# Patient Record
Sex: FEMALE | Race: BLACK OR AFRICAN AMERICAN | NOT HISPANIC OR LATINO | Employment: FULL TIME | URBAN - METROPOLITAN AREA
[De-identification: names, ages, dates, MRNs, and addresses within clinical notes are randomized per-mention and may not be internally consistent; named-entity substitution may affect disease eponyms.]

---

## 2021-04-29 ENCOUNTER — HOSPITAL ENCOUNTER (EMERGENCY)
Facility: MEDICAL CENTER | Age: 21
End: 2021-04-29
Attending: EMERGENCY MEDICINE
Payer: COMMERCIAL

## 2021-04-29 VITALS
HEART RATE: 73 BPM | HEIGHT: 65 IN | WEIGHT: 155.42 LBS | DIASTOLIC BLOOD PRESSURE: 72 MMHG | TEMPERATURE: 97.8 F | RESPIRATION RATE: 20 BRPM | SYSTOLIC BLOOD PRESSURE: 119 MMHG | BODY MASS INDEX: 25.9 KG/M2 | OXYGEN SATURATION: 99 %

## 2021-04-29 DIAGNOSIS — J02.9 PHARYNGITIS, UNSPECIFIED ETIOLOGY: ICD-10-CM

## 2021-04-29 LAB — S PYO DNA SPEC NAA+PROBE: NOT DETECTED

## 2021-04-29 PROCEDURE — 87651 STREP A DNA AMP PROBE: CPT

## 2021-04-29 PROCEDURE — 700111 HCHG RX REV CODE 636 W/ 250 OVERRIDE (IP): Performed by: EMERGENCY MEDICINE

## 2021-04-29 PROCEDURE — 99284 EMERGENCY DEPT VISIT MOD MDM: CPT | Mod: EDC

## 2021-04-29 RX ORDER — DEXAMETHASONE SODIUM PHOSPHATE 10 MG/ML
16 INJECTION, SOLUTION INTRAMUSCULAR; INTRAVENOUS ONCE
Status: COMPLETED | OUTPATIENT
Start: 2021-04-29 | End: 2021-04-29

## 2021-04-29 RX ADMIN — DEXAMETHASONE SODIUM PHOSPHATE 16 MG: 10 INJECTION INTRAMUSCULAR; INTRAVENOUS at 12:32

## 2021-04-29 NOTE — ED PROVIDER NOTES
ED Provider Note    CHIEF COMPLAINT  Chief Complaint   Patient presents with   • Sore Throat     started 3 days ago. History of tonsilitis. Throat is very red and swollen on visual exam.       HPI  Kamille Duenas is a 20 y.o. female who presents complaint of sore throat.  She is had this for about 3 days.  Her throat is very painful to swallow although she is eating and drinking.  She gets this 3 or 4 times per year.  She is been told in the past she needs her tonsils taken out but there is some kind of problem with insurance.  Typically she gets a dose of steroids and she is feeling better.  She denies any chest pain or shortness of breath but there is little bit of phlegm but no significant respite congestion.  Denies any belly pain.  No change in bowel or bladder.  No rashes.  She is here with her significant other, he has not been sick.  She denies any sick contacts or exposures, specifically COVID-19.  There is no other complaint.    PAST MEDICAL HISTORY  Past Medical History:   Diagnosis Date   • Tonsillitis        FAMILY HISTORY  History reviewed. No pertinent family history.    SOCIAL HISTORY  Social History     Tobacco Use   • Smoking status: Current Some Day Smoker   • Smokeless tobacco: Never Used   Substance Use Topics   • Alcohol use: Never   • Drug use: Never         SURGICAL HISTORY  History reviewed. No pertinent surgical history.    CURRENT MEDICATIONS  Home Medications     Reviewed by Siddharth Emmanuel R.N. (Registered Nurse) on 04/29/21 at 1107  Med List Status: Complete   Medication Last Dose Status        Patient Casimiro Taking any Medications                       I have reviewed the nurses notes and/or the list brought with the patient.    ALLERGIES  Allergies   Allergen Reactions   • Pcn [Penicillins]        REVIEW OF SYSTEMS  See HPI for further details. Review of systems as above, otherwise all other systems are negative.     PHYSICAL EXAM  VITAL SIGNS: /66   Pulse 75   Temp  "37.1 °C (98.7 °F) (Temporal)   Resp 14   Ht 1.651 m (5' 5\")   Wt 70.5 kg (155 lb 6.8 oz)   LMP 03/09/2021   SpO2 99%   Breastfeeding No   BMI 25.86 kg/m²     Constitutional: Well appearing patient in no acute distress.  Not toxic, nor ill in appearance.  HENT: Mucus membranes moist.  Oropharynx is notable for edema over the tonsillar pillars, and associate erythema.  There is no leukoplakia.  There is no asymmetry.  The floor the mouth is normal.  Eyes: Pupils equally round.  No scleral icterus.   Neck: Full nontender range of motion.  Lymphatic: Shotty cervical lymphadenopathy noted.   Cardiovascular: Regular heart rate and rhythm.  No murmurs, rubs, nor gallop appreciated.   Thorax & Lungs: Chest is nontender.  Lungs are clear to auscultation with good air movement bilaterally.  No wheeze, rhonchi, nor rales.   Abdomen: Soft, with no tenderness, rebound nor guarding.  No mass, pulsatile mass, nor hepatosplenomegaly appreciated.  Skin: No purpura nor petechia noted.  Extremities/Musculoskeletal: No sign of trauma.  Calves are nontender with no cords nor edema.  No Mary's sign.  Pulses are intact all around.   Neurologic: Alert & oriented.  Strength and sensation is intact all around.  Gait is normal.  Psychiatric: Normal affect appropriate for the clinical situation.    LABS  Labs Reviewed   GROUP A STREP BY PCR         MEDICAL RECORD  I have reviewed patient's medical record and pertinent results are listed above.    COURSE & MEDICAL DECISION MAKING  I have reviewed any medical record information, laboratory studies and radiographic results as noted above.  This patient presents with pharyngitis.  There is no evidence of group A strep, do not think antibiotics are indicated.  We discussed Covid testing, however she is declined that.  I did give her a dose of steroids, she is feeling better.  I talked over the case with Dr. Fonseca from ENT, who be happy to see her in follow-up.  This was relayed to the " patient.  Instructions on pharyngitis.        FINAL IMPRESSION  1. Pharyngitis, unspecified etiology           This dictation was created using voice recognition software.    Electronically signed by: Wilder Knowles M.D., 4/29/2021 12:38 PM

## 2021-04-29 NOTE — ED NOTES
"Pt ambulatory to Peds 50. Agree with triage RN note. Instructed to change into gown. Pt alert, pink, interactive and in NAD. Reports sore throat x 3 days. Denies fevers. Reports mild cough d/t nasal drainage. Pt states she frequently has swollen tonsils, but has been unable to FLUP with ENT \"because it got denied\". Displays age appropriate interaction with family and staff. Family at bedside. Call light within reach. Denies additional needs. Up for ERP eval.    "

## 2021-04-29 NOTE — ED TRIAGE NOTES
Chief Complaint   Patient presents with   • Sore Throat     started 3 days ago. History of tonsilitis. Throat is very red and swollen on visual exam.     Pt ambulatory to triage for above complaint, no airway compromise.  Pt is AO x 4, follows commands, and responds appropriately to questions. Patient's breathing is nonlabored and pain is currently 7/10 on the 0-10 pain scale.  Pt placed in lobby. Patient educated on triage process and encouraged to alert staff for any changes.

## 2021-04-29 NOTE — ED NOTES
Kamille RENEE/El.  Discharge instructions including s/s to return to ED, follow up appointments, hydration importance and medication administration provided to pt.    Pt verbalized understanding with no further questions and concerns.    Copy of discharge provided to pt.  Signed copy in chart.    Pt walked out of department with friend/family; pt in NAD, awake, alert, interactive and age appropriate.

## 2021-04-29 NOTE — ED NOTES
Pt medicated per MAR. Strep swab obtained and sent to lab. Pt and S/O updated on POC and potential lab wait times.

## 2021-06-03 ENCOUNTER — HOSPITAL ENCOUNTER (EMERGENCY)
Facility: MEDICAL CENTER | Age: 21
End: 2021-06-03
Attending: EMERGENCY MEDICINE

## 2021-06-03 VITALS
WEIGHT: 160.94 LBS | TEMPERATURE: 98 F | RESPIRATION RATE: 16 BRPM | SYSTOLIC BLOOD PRESSURE: 117 MMHG | BODY MASS INDEX: 26.81 KG/M2 | DIASTOLIC BLOOD PRESSURE: 74 MMHG | OXYGEN SATURATION: 97 % | HEIGHT: 65 IN | HEART RATE: 71 BPM

## 2021-06-03 DIAGNOSIS — R09.82 POST-NASAL DRIP: ICD-10-CM

## 2021-06-03 DIAGNOSIS — R09.89 CHOKING SENSATION: ICD-10-CM

## 2021-06-03 LAB
ANION GAP SERPL CALC-SCNC: 8 MMOL/L (ref 7–16)
BASOPHILS # BLD AUTO: 0.5 % (ref 0–1.8)
BASOPHILS # BLD: 0.04 K/UL (ref 0–0.12)
BUN SERPL-MCNC: 6 MG/DL (ref 8–22)
CALCIUM SERPL-MCNC: 9.4 MG/DL (ref 8.5–10.5)
CHLORIDE SERPL-SCNC: 106 MMOL/L (ref 96–112)
CO2 SERPL-SCNC: 23 MMOL/L (ref 20–33)
CREAT SERPL-MCNC: 1.03 MG/DL (ref 0.5–1.4)
EOSINOPHIL # BLD AUTO: 0.12 K/UL (ref 0–0.51)
EOSINOPHIL NFR BLD: 1.4 % (ref 0–6.9)
ERYTHROCYTE [DISTWIDTH] IN BLOOD BY AUTOMATED COUNT: 45.5 FL (ref 35.9–50)
GLUCOSE SERPL-MCNC: 89 MG/DL (ref 65–99)
HCG SERPL QL: NEGATIVE
HCT VFR BLD AUTO: 37.4 % (ref 37–47)
HGB BLD-MCNC: 12.4 G/DL (ref 12–16)
IMM GRANULOCYTES # BLD AUTO: 0.03 K/UL (ref 0–0.11)
IMM GRANULOCYTES NFR BLD AUTO: 0.3 % (ref 0–0.9)
LYMPHOCYTES # BLD AUTO: 3.47 K/UL (ref 1–4.8)
LYMPHOCYTES NFR BLD: 39.5 % (ref 22–41)
MCH RBC QN AUTO: 26.8 PG (ref 27–33)
MCHC RBC AUTO-ENTMCNC: 33.2 G/DL (ref 33.6–35)
MCV RBC AUTO: 81 FL (ref 81.4–97.8)
MONOCYTES # BLD AUTO: 0.85 K/UL (ref 0–0.85)
MONOCYTES NFR BLD AUTO: 9.7 % (ref 0–13.4)
NEUTROPHILS # BLD AUTO: 4.28 K/UL (ref 2–7.15)
NEUTROPHILS NFR BLD: 48.6 % (ref 44–72)
NRBC # BLD AUTO: 0 K/UL
NRBC BLD-RTO: 0 /100 WBC
PLATELET # BLD AUTO: 302 K/UL (ref 164–446)
PMV BLD AUTO: 10.3 FL (ref 9–12.9)
POTASSIUM SERPL-SCNC: 4 MMOL/L (ref 3.6–5.5)
RBC # BLD AUTO: 4.62 M/UL (ref 4.2–5.4)
SODIUM SERPL-SCNC: 137 MMOL/L (ref 135–145)
TSH SERPL DL<=0.005 MIU/L-ACNC: 2.39 UIU/ML (ref 0.38–5.33)
WBC # BLD AUTO: 8.8 K/UL (ref 4.8–10.8)

## 2021-06-03 PROCEDURE — 84703 CHORIONIC GONADOTROPIN ASSAY: CPT

## 2021-06-03 PROCEDURE — 85025 COMPLETE CBC W/AUTO DIFF WBC: CPT

## 2021-06-03 PROCEDURE — 84443 ASSAY THYROID STIM HORMONE: CPT

## 2021-06-03 PROCEDURE — 80048 BASIC METABOLIC PNL TOTAL CA: CPT

## 2021-06-03 PROCEDURE — 99284 EMERGENCY DEPT VISIT MOD MDM: CPT

## 2021-06-03 RX ORDER — FLUTICASONE PROPIONATE 50 MCG
1 SPRAY, SUSPENSION (ML) NASAL DAILY
Qty: 9.9 ML | Refills: 0 | Status: SHIPPED | OUTPATIENT
Start: 2021-06-03

## 2021-06-04 NOTE — ED NOTES
Pt discharged. Pt provided information about post nasal drip. Pt educated to follow-up w/ PCP and to return to the ER w/ any worsening symptoms. Pt had prescription e-scribed to pharmacy. Pt walked to the lobby w/ SO.

## 2021-06-04 NOTE — ED TRIAGE NOTES
"Kamille Duenas  20 y.o. F  Chief Complaint   Patient presents with   • Oral Swelling     Patient reporting swollen tonsils but denies soreness. Patient states this happens \"often.\" Patient scheduled an appointment with an ENT but her insurance did not cover it.    • Vaginal Bleeding     Patient reports passing large clots during her cycle but they are now tapering off      Vitals:    06/03/21 1924   BP: 113/69   Pulse: (Abnormal) 101   Resp: 14   Temp: 36.8 °C (98.2 °F)   SpO2: 97%     Triage process explained to patient, apologized for wait time, and returned to lobby.  Pt informed to notify staff of any change in condition.     "

## 2021-06-04 NOTE — ED PROVIDER NOTES
"ED Provider Note    Scribed for Juan Everett M.D. by Haim Teresa. 6/3/2021, 9:43 PM.    Primary care provider: Pcp Pt States None  Means of arrival: Walk-in  History obtained from: Patient  History limited by: None    CHIEF COMPLAINT  Chief Complaint   Patient presents with    Oral Swelling     Patient reporting swollen tonsils but denies soreness. Patient states this happens \"often.\" Patient scheduled an appointment with an ENT but her insurance did not cover it.     Vaginal Bleeding     Patient reports passing large clots during her cycle but they are now tapering off        HPI  Kamille Duenas is a 20 y.o. female who presents to the Emergency Department complaining of acute on chronic throat swelling onset earlier today. The patient has had several episodes of throat swelling in the past and states that her swelling today is not as severe as some previous episodes. The sensation of swelling is exacerbated when the patient lies flat on her back. She denies any sore throat. The patient also states she feels a sensation of mucus trickling down her throat. The patient presented to the ED today since she has not yet been able to get an appointment with her ENT. Patient denies a history of thyroid issues. Patient notes that she has recently been feeling severely hot but does not have a measured fever. She denies any dry skin or hair loss.  The patient also notes vaginal bleeding, stating she passed several larger than normal clots during her cycle. She denies a chance of pregnancy. Patient did not have any abdominal cramping during this menstrual period. Patient denies any cough or sore throat. She has not been vaccinated against Covid-19.    REVIEW OF SYSTEMS  Pertinent positives include oral swelling, vaginal bleeding, hot flashes, and post nasal drip. Pertinent negatives include no sore throat, cough, fever, dry skin, hair loss or abdominal cramping. All other systems negative.    PAST MEDICAL " "HISTORY   has a past medical history of Tonsillitis.    SURGICAL HISTORY  patient denies any surgical history    SOCIAL HISTORY  Social History     Tobacco Use    Smoking status: Former Smoker    Smokeless tobacco: Never Used   Vaping Use    Vaping Use: Never used   Substance Use Topics    Alcohol use: Yes     Comment: rarely     Drug use: Yes     Types: Inhaled     Comment: maile      Social History     Substance and Sexual Activity   Drug Use Yes    Types: Inhaled    Comment: [email or web address]       FAMILY HISTORY  History reviewed. No pertinent family history.    CURRENT MEDICATIONS  Home Medications       Reviewed by Rosa Ye R.N. (Registered Nurse) on 06/03/21 at 1936  Med List Status: Not Addressed     Medication Last Dose Status        Patient Casimiro Taking any Medications                           ALLERGIES  Allergies   Allergen Reactions    Pcn [Penicillins]        PHYSICAL EXAM  VITAL SIGNS: /64   Pulse 72   Temp 36.8 °C (98.2 °F) (Temporal)   Resp 16   Ht 1.651 m (5' 5\")   Wt 73 kg (160 lb 15 oz)   LMP 06/03/2021   SpO2 98%   BMI 26.78 kg/m²     Constitutional: Well developed, Well nourished, Mild distress.   HENT: Normocephalic, Atraumatic. Tonsils are mildly enlarged.  Mild postnasal drip, oropharynx moist, no erythema, oral exudates, or uvular deviation.  Eyes: Conjunctiva normal, No discharge.   Neck: Supple, No stridor. No lymphadenopathy. No thyromegaly.  Cardiovascular: Normal heart rate, Normal rhythm, No murmurs, equal pulses.   Pulmonary: Normal breath sounds, No respiratory distress, No wheezing, No rales, No rhonchi.  Abdomen:Soft, No tenderness, No masses, no rebound, no guarding.   Back: No CVA tenderness.   Musculoskeletal: No major deformities noted, No tenderness.   Skin: Warm, Dry, No erythema, No rash.   Neurologic: Alert & oriented x 3, Normal motor function,  No focal deficits noted.   Psychiatric: Affect normal, Judgment normal, Mood normal.     LABS  Results for " orders placed or performed during the hospital encounter of 06/03/21   HCG QUAL SERUM   Result Value Ref Range    Beta-Hcg Qualitative Serum Negative Negative   CBC WITH DIFFERENTIAL   Result Value Ref Range    WBC 8.8 4.8 - 10.8 K/uL    RBC 4.62 4.20 - 5.40 M/uL    Hemoglobin 12.4 12.0 - 16.0 g/dL    Hematocrit 37.4 37.0 - 47.0 %    MCV 81.0 (L) 81.4 - 97.8 fL    MCH 26.8 (L) 27.0 - 33.0 pg    MCHC 33.2 (L) 33.6 - 35.0 g/dL    RDW 45.5 35.9 - 50.0 fL    Platelet Count 302 164 - 446 K/uL    MPV 10.3 9.0 - 12.9 fL    Neutrophils-Polys 48.60 44.00 - 72.00 %    Lymphocytes 39.50 22.00 - 41.00 %    Monocytes 9.70 0.00 - 13.40 %    Eosinophils 1.40 0.00 - 6.90 %    Basophils 0.50 0.00 - 1.80 %    Immature Granulocytes 0.30 0.00 - 0.90 %    Nucleated RBC 0.00 /100 WBC    Neutrophils (Absolute) 4.28 2.00 - 7.15 K/uL    Lymphs (Absolute) 3.47 1.00 - 4.80 K/uL    Monos (Absolute) 0.85 0.00 - 0.85 K/uL    Eos (Absolute) 0.12 0.00 - 0.51 K/uL    Baso (Absolute) 0.04 0.00 - 0.12 K/uL    Immature Granulocytes (abs) 0.03 0.00 - 0.11 K/uL    NRBC (Absolute) 0.00 K/uL   BASIC METABOLIC PANEL   Result Value Ref Range    Sodium 137 135 - 145 mmol/L    Potassium 4.0 3.6 - 5.5 mmol/L    Chloride 106 96 - 112 mmol/L    Co2 23 20 - 33 mmol/L    Glucose 89 65 - 99 mg/dL    Bun 6 (L) 8 - 22 mg/dL    Creatinine 1.03 0.50 - 1.40 mg/dL    Calcium 9.4 8.5 - 10.5 mg/dL    Anion Gap 8.0 7.0 - 16.0   TSH WITH REFLEX TO FT4   Result Value Ref Range    TSH 2.390 0.380 - 5.330 uIU/mL   ESTIMATED GFR   Result Value Ref Range    GFR If African American >60 >60 mL/min/1.73 m 2    GFR If Non African American >60 >60 mL/min/1.73 m 2      All labs reviewed by me.    COURSE & MEDICAL DECISION MAKING  Pertinent Labs & Imaging studies reviewed. (See chart for details)    9:43 PM - Patient seen and examined at bedside. I discussed ordering labs to evaluate the patient's symptoms. I encouraged the patient to get the Covid-19 vaccine and assuaged her fears  about the vaccine being unsafe. Ordered CBC w/ diff, BMP, and HCG qualitative serum to evaluate her symptoms.    Did not feel any obvious thyromegaly.  Patient states that this feels like someone placing a hand on her throat when she lays back.  Patient's TSH is normal.    Patient had stated that she had had been passing large clots in her cycle but this happened last month and is not happening now I did not feel that a pelvic exam was warranted.    Medical Decision Making: At this point time I do not have exact etiology for this sensation.  I think it may be related to postnasal drip.  Patient's thyroid hormones are normal I do not palpate any thyroid megaly.  Patient's tonsils are slightly enlarged but not to the point where they are causing uvular shift and do not appear to be infected at this point in time.  At this point time I think she can be discharged home I will start her on some Flonase for her nasal congestion to help with the postnasal drip.     The patient will return for new or worsening symptoms and is stable at the time of discharge.    Patient has had high blood pressure while in the emergency department, felt likely secondary to medical condition. Counseled patient to monitor blood pressure at home and follow up with primary care physician.     DISPOSITION:  Patient will be discharged home in stable condition.    FOLLOW UP:  Your doctor    Schedule an appointment as soon as possible for a visit in 1 week      [address] [address] [phone]    If you need a doctor    [address] [address] [phone]    If you need a doctor      OUTPATIENT MEDICATIONS:  Discharge Medication List as of 6/3/2021 11:40 PM        START taking these medications    Details   fluticasone (FLONASE) 50 MCG/ACT nasal spray Administer 1 Spray into affected nostril(S) every day., Disp-9.9 mL, R-0, Normal               FINAL  IMPRESSION  1. Post-nasal drip    2. Choking sensation          Haim GARCIA (Scribe), am scribing for, and in the presence of, Juan Everett M.D.    Electronically signed by: Haim Teresa (Scribe), 6/3/2021    Juan GARCIA M.D. personally performed the services described in this documentation, as scribed by Haim Teresa in my presence, and it is both accurate and complete.    C.    The note accurately reflects work and decisions made by me.  Juan Everett M.D.  6/4/2021  3:18 AM

## 2021-08-21 PROCEDURE — 99283 EMERGENCY DEPT VISIT LOW MDM: CPT

## 2021-08-21 PROCEDURE — 93005 ELECTROCARDIOGRAM TRACING: CPT

## 2021-08-21 PROCEDURE — 93005 ELECTROCARDIOGRAM TRACING: CPT | Performed by: EMERGENCY MEDICINE

## 2021-08-22 ENCOUNTER — APPOINTMENT (OUTPATIENT)
Dept: RADIOLOGY | Facility: MEDICAL CENTER | Age: 21
End: 2021-08-22
Attending: EMERGENCY MEDICINE
Payer: MEDICAID

## 2021-08-22 ENCOUNTER — HOSPITAL ENCOUNTER (EMERGENCY)
Facility: MEDICAL CENTER | Age: 21
End: 2021-08-22
Attending: EMERGENCY MEDICINE
Payer: MEDICAID

## 2021-08-22 VITALS
SYSTOLIC BLOOD PRESSURE: 118 MMHG | HEART RATE: 70 BPM | WEIGHT: 176.15 LBS | BODY MASS INDEX: 29.35 KG/M2 | HEIGHT: 65 IN | RESPIRATION RATE: 16 BRPM | DIASTOLIC BLOOD PRESSURE: 66 MMHG | TEMPERATURE: 97.2 F | OXYGEN SATURATION: 96 %

## 2021-08-22 DIAGNOSIS — R06.02 SHORTNESS OF BREATH: ICD-10-CM

## 2021-08-22 DIAGNOSIS — Z20.822 SUSPECTED COVID-19 VIRUS INFECTION: ICD-10-CM

## 2021-08-22 LAB
EKG IMPRESSION: NORMAL
SARS-COV-2 RNA RESP QL NAA+PROBE: NOTDETECTED
SPECIMEN SOURCE: NORMAL

## 2021-08-22 PROCEDURE — 71045 X-RAY EXAM CHEST 1 VIEW: CPT

## 2021-08-22 PROCEDURE — U0005 INFEC AGEN DETEC AMPLI PROBE: HCPCS

## 2021-08-22 PROCEDURE — U0003 INFECTIOUS AGENT DETECTION BY NUCLEIC ACID (DNA OR RNA); SEVERE ACUTE RESPIRATORY SYNDROME CORONAVIRUS 2 (SARS-COV-2) (CORONAVIRUS DISEASE [COVID-19]), AMPLIFIED PROBE TECHNIQUE, MAKING USE OF HIGH THROUGHPUT TECHNOLOGIES AS DESCRIBED BY CMS-2020-01-R: HCPCS

## 2021-08-22 RX ORDER — ALBUTEROL SULFATE 90 UG/1
2 AEROSOL, METERED RESPIRATORY (INHALATION) EVERY 6 HOURS PRN
Qty: 8.5 G | Refills: 0 | Status: SHIPPED | OUTPATIENT
Start: 2021-08-22

## 2021-08-22 NOTE — ED NOTES
Pt Dced at this time. AAOx4, VSS, ambulating with steady gait. Education provided on new rx, f/u care, and all questions addressed. Pt verbalized understanding and ambulated off unit, NADN.

## 2021-08-22 NOTE — ED TRIAGE NOTES
"Chief Complaint   Patient presents with   • Shortness of Breath     Pt states \"my chest is really tight and I'm having trouble breathing.\" Pt also states she has been feeling sick lately, has a cough, and had recent covid exposures at a \"cooking class.\"        Ambulatory to triage for above complaint. Pt states she is unvaccinated and \"doesn't want the shot unless she has to have it.\"  Educated on triage process, encourage to inform staff of any changes.     /71   Pulse 79   Temp 36.2 °C (97.2 °F) (Oral)   Resp 16   Ht 1.638 m (5' 4.5\")   Wt 79.9 kg (176 lb 2.4 oz)   SpO2 99%   BMI 29.77 kg/m²     "

## 2021-08-22 NOTE — ED PROVIDER NOTES
"ED Provider Note    CHIEF COMPLAINT  Chief Complaint   Patient presents with   • Shortness of Breath     Pt states \"my chest is really tight and I'm having trouble breathing.\" Pt also states she has been feeling sick lately, has a cough, and had recent covid exposures at a \"cooking class.\"        HPI  20 y.o. presents to the emergency department with a chief complaint of concerns regarding difficulty breathing and a tightness that she has had with deep inspiration.  She says that she has had a cough, she feels like she has had body aches and chills and had recent Covid exposures after going to a cooking class.  She denies loss of taste and smell, she feels like she has had 3 days of the symptoms with escalations and is concerned with the possibility of developing pneumonia, having Covid.  She is currently unvaccinated.    She has no prior history of asthma, feels like she may have had some wheezes that are currently subsided.    Potential COVID-19 exposure history: has had immediate contact (within 6 feet of continued exposure) with a sick individual    REVIEW OF SYSTEMS  Constitutional: subjective fever, No unintentional recent weight loss  Skin: No rashes or diaphoresis.  Eyes: No change in vision, no discharge.  ENT: No hearing change. No rhinorrhea or nasal congestion, no sore throat or difficulty swallowing.  Respiratory: as above, + coughing without hemoptysis. + Wheezing.  Cardiac: No  palpitations, edema.  GI: No Abdominal Pain; N/V; diarrhea, constipation. No blood in stool.  : No dysuria. No new discharge. No frequency or urgency. No hesitancy.   MSK: No calf pain or swelling.  Neuro: No HA or paresthesias. No focal weakness.  Endocrine: No polyuria or polydipsia. No heat or cold intolerance.  Heme: No easy bruising. No history of bleeding disorders.    PAST MEDICAL HISTORY   has a past medical history of Tonsillitis.    SOCIAL HISTORY  Social History     Tobacco Use   • Smoking status: Former Smoker   • " "Smokeless tobacco: Never Used   Vaping Use   • Vaping Use: Never used   Substance and Sexual Activity   • Alcohol use: Yes     Comment: rarely    • Drug use: Yes     Types: Inhaled     Comment: [email or web address]   • Sexual activity: Not on file     SURGICAL HISTORY  patient denies any surgical history    CURRENT MEDICATIONS  Home Medications     Reviewed by Ebony Mishra R.N. (Registered Nurse) on 08/21/21 at 2121  Med List Status: Not Addressed   Medication Last Dose Status   fluticasone (FLONASE) 50 MCG/ACT nasal spray  Active              ALLERGIES  Allergies   Allergen Reactions   • Pcn [Penicillins]      PHYSICAL EXAM  VITAL SIGNS: /66   Pulse 70   Temp 36.2 °C (97.2 °F) (Oral)   Resp 16   Ht 1.638 m (5' 4.5\")   Wt 79.9 kg (176 lb 2.4 oz)   SpO2 96%   BMI 29.77 kg/m²    Genl: Sitting comfortably, speaking clearly, appears in no acute distress   Head: NC/AT   ENT: Mucous membranes moist, posterior pharynx clear, uvula midline, nares patent bilaterally   Eyes: Normal sclera, pupils equal round reactive to light  Neck: Supple, FROM, no obvious LAD appreciated   Pulmonary: Lungs are clear to auscultation bilaterally.   Chest: No TTP  CV:  RRR, no murmur appreciated, pulses 2+ in both upper and lower extremities,  Abdomen: soft, NT/ND; no rebound/guarding, no masses palpated, no HSM  : no CVA or suprapubic tenderness   Musculoskeletal: Pain free ROM of the neck. Moving upper and lower extremities and spontaneous in coordinated fashion  Neuro: A&Ox4 (person, place, time, situation), speech fluent, no focal deficits appreciated  Psych: Patient has an appropriate affect and behavior  Skin: No rash or lesions.  No pallor or jaundice.  No cyanosis.  Warm and dry.     COURSE & MEDICAL DECISION MAKING  DX-CHEST-PORTABLE (1 VIEW)   Final Result      No acute cardiopulmonary abnormality.        Labs Reviewed   SARS-COV-2, PCR (IN-HOUSE)    Narrative:     Have you been in close contact with a person who is " suspected  or known to be positive for COVID-19 within the last 30 days  (e.g. last seen that person < 30 days ago)->No     Results for orders placed or performed during the hospital encounter of 21   EKG (NOW)   Result Value Ref Range    Report       [facility] Emergency Dept[facility]    Test Date:  2021  Pt Name:    PETR SALDANA                Department: ER  MRN:        [id number]                      Room:  Gender:     Female                       Technician: [id number]  :        [id number]                   Requested By:ER TRIAGE PROTOCOL  Order #:    [id number]                    Reading MD: Bobby Duke MD    Measurements  Intervals                                Axis  Rate:       74                           P:          72  DC:         168                          QRS:        79  QRSD:       88                           T:          38  QT:         396  QTc:        440    Interpretive Statements  SINUS RHYTHM  No previous ECG available for comparison  Electronically Signed On 2021 1:03:55 PDT by Bobby Duke MD       Pertinent Labs & Imaging studies reviewed. (See chart for details)    MDM  Number of Diagnoses or Management Options    1.) As of 21, I used the current [facility] PUI algorithm to help guide my medical decision making and testing.   I verified that the patient was wearing a mask and I was wearing appropriate PPE during each encounter with patient. The patient's mask was on the patient at all times during my encounter except for a brief view of the oropharynx.    2.) Patient presents with symptoms concerning for influenza like illness as described above.  During the initial assessment it is noted that the pt is not in respiratory distress.  The constellation of symptoms are suggestive of viral illness, possible pneumonia, she does not have a history of other acute respiratory symptoms and demonstrates no findings of restricted air movement or wheezing on exam.    EKG is  without acute ischemia, she does not have exertional chest pain nor does she have any familial history of early cardiac disease or death.  This is not consistent with ACS and would not be further pursued.  There is no evidence of right heart strain and her be unlikely that this is the sequela of an acute PE with no vital sign abnormalities or hypoxia.  Chest x-ray shows no evidence of focal consolidation.  Outpatient Covid test is obtained as the patient is not acutely hypoxic, has no tachycardia febrile illness at this time.  She understands that this will be resulted on my chart and she will need to follow-up with us upon discharge.    3.) Disposition:DISCHARGE: The patient is appropriate for outpatient treatment at this time. They are defined as lower risk using a combination of PSI/PORT score, physical exam and history of present illness.  I discussed with the patient that they likely have a viral illness and may have COVID-19.   Because we are awaiting test results regarding Covid-19, we discussed that the patient will need to remain in isolation until all three of the following are true: You are 10 days from symptom onset, your symptoms are improving AND you have been fever free for at least 72 hours.  They agreed to return to the emergency department immediately for worsening or persistent symptoms, including but not limited to significant or worsening shortness of breath or severe lightheadedness.    Visit Diagnoses     ICD-10-CM   1. Shortness of breath  R06.02   2. Suspected COVID-19 virus infection  Z20.822

## 2022-04-06 ENCOUNTER — HOSPITAL ENCOUNTER (EMERGENCY)
Facility: MEDICAL CENTER | Age: 22
End: 2022-04-07
Attending: EMERGENCY MEDICINE
Payer: COMMERCIAL

## 2022-04-06 VITALS
TEMPERATURE: 97.6 F | SYSTOLIC BLOOD PRESSURE: 117 MMHG | RESPIRATION RATE: 16 BRPM | HEIGHT: 65 IN | DIASTOLIC BLOOD PRESSURE: 67 MMHG | HEART RATE: 84 BPM | BODY MASS INDEX: 29.32 KG/M2 | OXYGEN SATURATION: 100 % | WEIGHT: 176 LBS

## 2022-04-06 DIAGNOSIS — R13.19 ESOPHAGEAL DYSPHAGIA: ICD-10-CM

## 2022-04-06 PROCEDURE — A9270 NON-COVERED ITEM OR SERVICE: HCPCS | Performed by: EMERGENCY MEDICINE

## 2022-04-06 PROCEDURE — 99283 EMERGENCY DEPT VISIT LOW MDM: CPT

## 2022-04-06 PROCEDURE — 700102 HCHG RX REV CODE 250 W/ 637 OVERRIDE(OP): Performed by: EMERGENCY MEDICINE

## 2022-04-06 RX ADMIN — LIDOCAINE HYDROCHLORIDE 30 ML: 20 SOLUTION OROPHARYNGEAL at 23:48

## 2022-04-06 NOTE — Clinical Note
ERP spoke with patient, patient was medicated per orders. Patient unable to stay for discharge paperwork because she needed to catch the last bus by 0014. Patient ambulated out of the department with steady gait.

## 2022-04-07 NOTE — ED TRIAGE NOTES
Kamille Duenas  21 y.o. female  Chief Complaint   Patient presents with   • Swallowed Foreign Body     Patient states she ate a chip and feels that it is sharp and stuck in throat. Patient is able to maintain secretions, states she feels it is hard to breathe. History of chronic tonsillitis.      Patient NAD, clearing throat repeatedly.      Vitals:    04/06/22 2242   BP: 117/67   Pulse: 84   Resp: 16   Temp: 36.4 °C (97.6 °F)   SpO2: 100%       Triage process explained to patient, apologized for wait time, and returned to [facility].  Pt informed to notify staff of any change in condition.

## 2022-04-07 NOTE — ED PROVIDER NOTES
"ED Provider Note    CHIEF COMPLAINT  Chief Complaint   Patient presents with   • Swallowed Foreign Body     Patient states she ate a chip and feels that it is sharp and stuck in throat. Patient is able to maintain secretions, states she feels it is hard to breathe. History of chronic tonsillitis.        HPI  Kamille Duenas is a 21 y.o. female who presents with a sensation that she has a chip stuck in her throat.  She states that she can drink water but feels like she has something stuck in her throat after eating some chips.  She does not have any current difficulty with breathing.  She has not had any vomiting.  She has not had any recent fevers.    REVIEW OF SYSTEMS  See HPI for further details. All other systems are negative.     PAST MEDICAL HISTORY  Past Medical History:   Diagnosis Date   • Tonsillitis        FAMILY HISTORY  [unfilled]    SOCIAL HISTORY  Social History     Socioeconomic History   • Marital status: Single   Tobacco Use   • Smoking status: Former Smoker   • Smokeless tobacco: Never Used   Vaping Use   • Vaping Use: Never used   Substance and Sexual Activity   • Alcohol use: Yes     Comment: rarely    • Drug use: Yes     Types: Inhaled     Comment: maile       SURGICAL HISTORY  No past surgical history on file.    CURRENT MEDICATIONS  Home Medications     Reviewed by Marquita Nayak R.N. (Registered Nurse) on 04/06/22 at 2249  Med List Status: <None>   Medication Last Dose Status   albuterol 108 (90 Base) MCG/ACT Aero Soln inhalation aerosol  Active   fluticasone (FLONASE) 50 MCG/ACT nasal spray  Active                ALLERGIES  Allergies   Allergen Reactions   • Pcn [Penicillins]        PHYSICAL EXAM  VITAL SIGNS: /67   Pulse 84   Temp 36.4 °C (97.6 °F) (Temporal)   Resp 16   Ht 1.651 m (5' 5\")   Wt 79.8 kg (176 lb)   SpO2 100%   BMI 29.29 kg/m²       Constitutional: Well developed, Well nourished, No acute distress, Non-toxic appearance.   HENT: Normocephalic, Atraumatic, " Bilateral external ears normal, Oropharynx tonsillar hypertrophy with no obvious foreign body, no exudates, no erythema, Nose normal.   Eyes: PERRLA, EOMI, Conjunctiva normal, No discharge.   Neck: Normal range of motion, No tenderness, Supple, No stridor.   Lymphatic: No lymphadenopathy noted.   Cardiovascular: Normal heart rate, Normal rhythm, No murmurs, No rubs, No gallops.   Thorax & Lungs: Normal breath sounds, No respiratory distress, No wheezing, No chest tenderness.   Abdomen: Bowel sounds normal, Soft, No tenderness, No masses, No pulsatile masses.   Skin: Warm, Dry, No erythema, No rash.   Back: No tenderness, No CVA tenderness.   Extremities: Intact distal pulses, No edema, No tenderness, No cyanosis, No clubbing.    Neurologic: Alert & oriented x 3, Normal motor function, Normal sensory function, No focal deficits noted.   Psychiatric: Affect normal, Judgment normal, Mood normal.       COURSE & MEDICAL DECISION MAKING  Pertinent Labs & Imaging studies reviewed. (See chart for details)  This a 21-year-old female who presents the emerge department with a sore throat after eating some chips.  Clinically not appreciate any evidence of an obstruction.  She had a GI cocktail and significant improvement.  I suspect she may have a scratch in the esophagus or possible gastroesophageal reflux disease.  I was able to recheck the patient as she left that she had to catch a bus.    FINAL IMPRESSION  1.  Dysphagia      Electronically signed by: Yash Wallis M.D., 4/6/2022 11:44 PM

## 2022-04-07 NOTE — ED NOTES
Patient states that she has to leave now in order to catch her bus home. Patient encouraged to stay. Patient states she has to go now. Patient ambulated with her  out of the department with a steady gait.

## 2022-04-29 ENCOUNTER — HOSPITAL ENCOUNTER (EMERGENCY)
Facility: MEDICAL CENTER | Age: 22
End: 2022-04-29
Attending: EMERGENCY MEDICINE
Payer: COMMERCIAL

## 2022-04-29 VITALS
BODY MASS INDEX: 31 KG/M2 | HEIGHT: 65 IN | SYSTOLIC BLOOD PRESSURE: 121 MMHG | TEMPERATURE: 98.1 F | HEART RATE: 68 BPM | DIASTOLIC BLOOD PRESSURE: 80 MMHG | WEIGHT: 186.07 LBS | OXYGEN SATURATION: 97 % | RESPIRATION RATE: 16 BRPM

## 2022-04-29 DIAGNOSIS — B96.89 GARDNERELLA VAGINITIS: ICD-10-CM

## 2022-04-29 DIAGNOSIS — N76.0 GARDNERELLA VAGINITIS: ICD-10-CM

## 2022-04-29 DIAGNOSIS — R11.2 NON-INTRACTABLE VOMITING WITH NAUSEA, UNSPECIFIED VOMITING TYPE: ICD-10-CM

## 2022-04-29 DIAGNOSIS — R10.9 ABDOMINAL PAIN, UNSPECIFIED ABDOMINAL LOCATION: ICD-10-CM

## 2022-04-29 LAB
ALBUMIN SERPL BCP-MCNC: 4.1 G/DL (ref 3.2–4.9)
ALBUMIN/GLOB SERPL: 1.3 G/DL
ALP SERPL-CCNC: 63 U/L (ref 30–99)
ALT SERPL-CCNC: 9 U/L (ref 2–50)
ANION GAP SERPL CALC-SCNC: 9 MMOL/L (ref 7–16)
APPEARANCE UR: ABNORMAL
AST SERPL-CCNC: 19 U/L (ref 12–45)
BACTERIA #/AREA URNS HPF: ABNORMAL /HPF
BASOPHILS # BLD AUTO: 0.4 % (ref 0–1.8)
BASOPHILS # BLD: 0.04 K/UL (ref 0–0.12)
BILIRUB SERPL-MCNC: 0.2 MG/DL (ref 0.1–1.5)
BILIRUB UR QL STRIP.AUTO: NEGATIVE
BUN SERPL-MCNC: 5 MG/DL (ref 8–22)
CALCIUM SERPL-MCNC: 9.4 MG/DL (ref 8.5–10.5)
CANDIDA DNA VAG QL PROBE+SIG AMP: NEGATIVE
CHLORIDE SERPL-SCNC: 104 MMOL/L (ref 96–112)
CO2 SERPL-SCNC: 24 MMOL/L (ref 20–33)
COLOR UR: YELLOW
CREAT SERPL-MCNC: 0.83 MG/DL (ref 0.5–1.4)
EOSINOPHIL # BLD AUTO: 0.12 K/UL (ref 0–0.51)
EOSINOPHIL NFR BLD: 1.3 % (ref 0–6.9)
EPI CELLS #/AREA URNS HPF: ABNORMAL /HPF
ERYTHROCYTE [DISTWIDTH] IN BLOOD BY AUTOMATED COUNT: 51.4 FL (ref 35.9–50)
G VAGINALIS DNA VAG QL PROBE+SIG AMP: POSITIVE
GFR SERPLBLD CREATININE-BSD FMLA CKD-EPI: 102 ML/MIN/1.73 M 2
GLOBULIN SER CALC-MCNC: 3.1 G/DL (ref 1.9–3.5)
GLUCOSE SERPL-MCNC: 87 MG/DL (ref 65–99)
GLUCOSE UR STRIP.AUTO-MCNC: NEGATIVE MG/DL
HCG SERPL QL: NEGATIVE
HCT VFR BLD AUTO: 37 % (ref 37–47)
HGB BLD-MCNC: 12.5 G/DL (ref 12–16)
HIV 1+2 AB+HIV1 P24 AG SERPL QL IA: NORMAL
HYALINE CASTS #/AREA URNS LPF: ABNORMAL /LPF
IMM GRANULOCYTES # BLD AUTO: 0.03 K/UL (ref 0–0.11)
IMM GRANULOCYTES NFR BLD AUTO: 0.3 % (ref 0–0.9)
KETONES UR STRIP.AUTO-MCNC: NEGATIVE MG/DL
LEUKOCYTE ESTERASE UR QL STRIP.AUTO: NEGATIVE
LIPASE SERPL-CCNC: 19 U/L (ref 11–82)
LYMPHOCYTES # BLD AUTO: 2.89 K/UL (ref 1–4.8)
LYMPHOCYTES NFR BLD: 31.5 % (ref 22–41)
MCH RBC QN AUTO: 28 PG (ref 27–33)
MCHC RBC AUTO-ENTMCNC: 33.8 G/DL (ref 33.6–35)
MCV RBC AUTO: 82.8 FL (ref 81.4–97.8)
MICRO URNS: ABNORMAL
MONOCYTES # BLD AUTO: 0.57 K/UL (ref 0–0.85)
MONOCYTES NFR BLD AUTO: 6.2 % (ref 0–13.4)
NEUTROPHILS # BLD AUTO: 5.52 K/UL (ref 2–7.15)
NEUTROPHILS NFR BLD: 60.3 % (ref 44–72)
NITRITE UR QL STRIP.AUTO: NEGATIVE
NRBC # BLD AUTO: 0 K/UL
NRBC BLD-RTO: 0 /100 WBC
PH UR STRIP.AUTO: 6 [PH] (ref 5–8)
PLATELET # BLD AUTO: 324 K/UL (ref 164–446)
PMV BLD AUTO: 10 FL (ref 9–12.9)
POTASSIUM SERPL-SCNC: 4.1 MMOL/L (ref 3.6–5.5)
PROT SERPL-MCNC: 7.2 G/DL (ref 6–8.2)
PROT UR QL STRIP: NEGATIVE MG/DL
RBC # BLD AUTO: 4.47 M/UL (ref 4.2–5.4)
RBC # URNS HPF: ABNORMAL /HPF
RBC UR QL AUTO: NEGATIVE
SODIUM SERPL-SCNC: 137 MMOL/L (ref 135–145)
SP GR UR STRIP.AUTO: 1.02
T PALLIDUM AB SER QL IA: NORMAL
T VAGINALIS DNA VAG QL PROBE+SIG AMP: NEGATIVE
UROBILINOGEN UR STRIP.AUTO-MCNC: 0.2 MG/DL
WBC # BLD AUTO: 9.2 K/UL (ref 4.8–10.8)
WBC #/AREA URNS HPF: ABNORMAL /HPF

## 2022-04-29 PROCEDURE — 87491 CHLMYD TRACH DNA AMP PROBE: CPT

## 2022-04-29 PROCEDURE — 87510 GARDNER VAG DNA DIR PROBE: CPT

## 2022-04-29 PROCEDURE — 86780 TREPONEMA PALLIDUM: CPT

## 2022-04-29 PROCEDURE — 85025 COMPLETE CBC W/AUTO DIFF WBC: CPT

## 2022-04-29 PROCEDURE — 84703 CHORIONIC GONADOTROPIN ASSAY: CPT

## 2022-04-29 PROCEDURE — 81001 URINALYSIS AUTO W/SCOPE: CPT

## 2022-04-29 PROCEDURE — 99284 EMERGENCY DEPT VISIT MOD MDM: CPT

## 2022-04-29 PROCEDURE — 87389 HIV-1 AG W/HIV-1&-2 AB AG IA: CPT

## 2022-04-29 PROCEDURE — 80053 COMPREHEN METABOLIC PANEL: CPT

## 2022-04-29 PROCEDURE — 87660 TRICHOMONAS VAGIN DIR PROBE: CPT

## 2022-04-29 PROCEDURE — 83690 ASSAY OF LIPASE: CPT

## 2022-04-29 PROCEDURE — 87591 N.GONORRHOEAE DNA AMP PROB: CPT

## 2022-04-29 PROCEDURE — 87480 CANDIDA DNA DIR PROBE: CPT

## 2022-04-29 RX ORDER — METRONIDAZOLE 500 MG/1
500 TABLET ORAL 2 TIMES DAILY
Qty: 14 TABLET | Refills: 0 | Status: SHIPPED | OUTPATIENT
Start: 2022-04-29 | End: 2022-05-06

## 2022-04-29 ASSESSMENT — ENCOUNTER SYMPTOMS
VOMITING: 1
DIARRHEA: 0
ABDOMINAL PAIN: 1
SORE THROAT: 0
CONSTIPATION: 1

## 2022-04-29 NOTE — ED PROVIDER NOTES
ED Provider Note    Scribed for Ok Mcadams M.D. by René Garcia. 4/29/2022, 3:58 PM.    Primary care provider: Pcp Pt States None  Means of arrival: Walk-in  History obtained from: Patient  History limited by: None    CHIEF COMPLAINT  Chief Complaint   Patient presents with   • Abdominal Pain     Across upper abd x 1 week.    • N/V     X2 weeks.        HPI  Kamille Duenas is a 21 y.o. female who presents to the Emergency Department for evaluation of upper abdominal pain. Onset two weeks ago. She states that her pain is bilateral across her abdomen, however, if she lays down on one side for too long it becomes significantly worse on that side. She describes the pain as a cramping sensation. She has associated symptoms of vomiting, mild constipation, and vaginal spotting. She notes she has had several episodes of vomiting over the past week, but has not vomited today. She usually vomits after eating fast food. She denies any dysuria, sore throat, runny nose, diarrhea, vaginal discharge, or vaginal swelling. She does not believe that she has an STD, but notes that it could be possible. She took an at home pregnancy test that was negative. She is allergic to penicillin based medications. She denies any major medical history or taking any daily medications. There are no known alleviating or exacerbating factors.       REVIEW OF SYSTEMS  Review of Systems   HENT: Negative for sore throat.         Negative for runny nose.    Gastrointestinal: Positive for abdominal pain, constipation and vomiting. Negative for diarrhea.   Genitourinary: Negative for dysuria.        Positive for vaginal spotting.  Negative for vaginal discharge.  Negative for vaginal swelling.    All other systems reviewed and are negative.      PAST MEDICAL HISTORY   has a past medical history of Tonsillitis.    SURGICAL HISTORY  patient denies any surgical history    SOCIAL HISTORY  Social History     Tobacco Use   • Smoking status: Former  "Smoker   • Smokeless tobacco: Never Used   Vaping Use   • Vaping Use: Never used   Substance Use Topics   • Alcohol use: Yes     Comment: rarely    • Drug use: Yes     Types: Inhaled     Comment: maile      Social History     Substance and Sexual Activity   Drug Use Yes   • Types: Inhaled    Comment: maile       FAMILY HISTORY  None pertinent.     CURRENT MEDICATIONS  Home Medications     Reviewed by Sampson Dubose R.N. (Registered Nurse) on 04/29/22 at 1520  Med List Status: Partial   Medication Last Dose Status   albuterol 108 (90 Base) MCG/ACT Aero Soln inhalation aerosol  Active   fluticasone (FLONASE) 50 MCG/ACT nasal spray  Active                ALLERGIES  Allergies   Allergen Reactions   • Pcn [Penicillins]        PHYSICAL EXAM  VITAL SIGNS: /79   Pulse 72   Temp 36.6 °C (97.9 °F) (Temporal)   Resp 16   Ht 1.651 m (5' 5\")   Wt 84.4 kg (186 lb 1.1 oz)   SpO2 100%   BMI 30.96 kg/m²   Vitals reviewed.  Constitutional: Well developed, Well nourished, No acute distress, Non-toxic appearance.   HENT: Normocephalic, Atraumatic, Bilateral external ears normal, Oropharynx moist, No oral exudates, Nose normal.   Eyes: PERRL, EOMI, Conjunctiva normal, No discharge.   Neck: Normal range of motion, No tenderness, Supple, No stridor.   Cardiovascular: Normal heart rate, Normal rhythm, No murmurs, No rubs, No gallops.   Thorax & Lungs: Normal breath sounds, No respiratory distress, No wheezing, No chest tenderness.   Abdomen: Bowel sounds normal, Soft, No tenderness  GYN exam performed by myself with Alethea LAWSON as chaperone shows normal external and internal mucosa no cervicitis or discharge.  No adnexal masses or tenderness.  No cervical motion tenderness.  Cultures are sent.  Skin: Warm, Dry, No erythema, No rash.   Back: No tenderness, No CVA tenderness.   Musculoskeletal: Good range of motion in all major joints.   Neurologic: Alert, Normal motor function, Normal sensory function, No focal deficits " noted.   Psychiatric: Affect normal    LABS  Results for orders placed or performed during the hospital encounter of 04/29/22   Chlamydia/GC, PCR (Urine)    Specimen: Urine   Result Value Ref Range    Source Urine    T. Pallidum AB EIA (Syphilis)   Result Value Ref Range    Syphilis, Treponemal Qual Non-Reactive Non-Reactive   HIV AG/AB Combo Assay Screening   Result Value Ref Range    HIV Ag/Ab Combo Assay Non-Reactive Non Reactive   CBC WITH DIFFERENTIAL   Result Value Ref Range    WBC 9.2 4.8 - 10.8 K/uL    RBC 4.47 4.20 - 5.40 M/uL    Hemoglobin 12.5 12.0 - 16.0 g/dL    Hematocrit 37.0 37.0 - 47.0 %    MCV 82.8 81.4 - 97.8 fL    MCH 28.0 27.0 - 33.0 pg    MCHC 33.8 33.6 - 35.0 g/dL    RDW 51.4 (H) 35.9 - 50.0 fL    Platelet Count 324 164 - 446 K/uL    MPV 10.0 9.0 - 12.9 fL    Neutrophils-Polys 60.30 44.00 - 72.00 %    Lymphocytes 31.50 22.00 - 41.00 %    Monocytes 6.20 0.00 - 13.40 %    Eosinophils 1.30 0.00 - 6.90 %    Basophils 0.40 0.00 - 1.80 %    Immature Granulocytes 0.30 0.00 - 0.90 %    Nucleated RBC 0.00 /100 WBC    Neutrophils (Absolute) 5.52 2.00 - 7.15 K/uL    Lymphs (Absolute) 2.89 1.00 - 4.80 K/uL    Monos (Absolute) 0.57 0.00 - 0.85 K/uL    Eos (Absolute) 0.12 0.00 - 0.51 K/uL    Baso (Absolute) 0.04 0.00 - 0.12 K/uL    Immature Granulocytes (abs) 0.03 0.00 - 0.11 K/uL    NRBC (Absolute) 0.00 K/uL   COMP METABOLIC PANEL   Result Value Ref Range    Sodium 137 135 - 145 mmol/L    Potassium 4.1 3.6 - 5.5 mmol/L    Chloride 104 96 - 112 mmol/L    Co2 24 20 - 33 mmol/L    Anion Gap 9.0 7.0 - 16.0    Glucose 87 65 - 99 mg/dL    Bun 5 (L) 8 - 22 mg/dL    Creatinine 0.83 0.50 - 1.40 mg/dL    Calcium 9.4 8.5 - 10.5 mg/dL    AST(SGOT) 19 12 - 45 U/L    ALT(SGPT) 9 2 - 50 U/L    Alkaline Phosphatase 63 30 - 99 U/L    Total Bilirubin 0.2 0.1 - 1.5 mg/dL    Albumin 4.1 3.2 - 4.9 g/dL    Total Protein 7.2 6.0 - 8.2 g/dL    Globulin 3.1 1.9 - 3.5 g/dL    A-G Ratio 1.3 g/dL   LIPASE   Result Value Ref Range     Lipase 19 11 - 82 U/L   URINALYSIS,CULTURE IF INDICATED    Specimen: Urine   Result Value Ref Range    Color Yellow     Character Cloudy (A)     Specific Gravity 1.016 <1.035    Ph 6.0 5.0 - 8.0    Glucose Negative Negative mg/dL    Ketones Negative Negative mg/dL    Protein Negative Negative mg/dL    Bilirubin Negative Negative    Urobilinogen, Urine 0.2 Negative    Nitrite Negative Negative    Leukocyte Esterase Negative Negative    Occult Blood Negative Negative    Micro Urine Req Microscopic    HCG QUAL SERUM   Result Value Ref Range    Beta-Hcg Qualitative Serum Negative Negative   URINE MICROSCOPIC (W/UA)   Result Value Ref Range    WBC 0-2 /hpf    RBC 0-2 /hpf    Bacteria Few (A) None /hpf    Epithelial Cells Few /hpf    Hyaline Cast 0-2 /lpf   ESTIMATED GFR   Result Value Ref Range    GFR (CKD-EPI) 102 >60 mL/min/1.73 m 2   VAGINAL PATHOGENS DNA PANEL   Result Value Ref Range    Candida species DNA Probe Negative Negative    Trichamonas vaginalis DNA Probe Negative Negative    Gardnerella vaginalis DNA Probe POSITIVE (A) Negative   Chlamydia & N.gonorrhoeae by PCR   Result Value Ref Range    Source Genital        All labs reviewed by me.      COURSE & MEDICAL DECISION MAKING  Pertinent Labs & Imaging studies reviewed. (See chart for details)    3:58 PM Patient seen and examined at bedside. The patient presents with bilateral upper abdominal pain and vomiting, and the differential diagnosis includes but is not limited to gastroenteritis, colitis, viral illness, UTI, pregnancy, PID, constipation.  Ordered for Urine Microscopic, Chlamydia/GC, PCR, T. Pallidum AB EIA, HIV AG/AB combo assay screening, CBC with differential, CMP, Lipase, HCG qual serum, and Urinalysis to evaluate.       Patient has complaints of several days of abdominal pain generally she is feeling better.  Her vital signs are normal she is afebrile and she has a reassuring examination without any tenderness or peritonitis.  She has concerns for  possible STDs.  She is worked up with labs pelvic exam was performed.  She is made aware that these results will not all be back tonight.  Her results up to this point have been reassuring.    She is tolerating fluids and food.  Repeat exam is benign.  I do not see an indication for any imaging at this time.  Do not think she requires a pelvic imaging or a CT.    Vaginal pathogen DNA probe is positive for Gardnerella she will be treated with 7 days of Flagyl.  She advised not to drink on Flagyl.  To return for discharge or abdominal pain.  To return to emergency room in 24 hours if she still having abdominal comfort or if she has a fever.  Otherwise she will follow-up with primary care.    7:11 PM - I reevaluated the patient at bedside. I discussed the patient's diagnostic study results which. I discussed plan for discharge and follow up as outlined below. I instructed the patient to drink plenty of fluids and to take antibiotics as prescribed. As well as to follow-up with her primary care provider. The patient is stable for discharge at this time and will return for any new or worsening symptoms. Patient verbalizes understanding and support with my plan for discharge.       Patient is counseled to practice safe sex.  Questions are answered she is agreeable to plan and discharged in good condition.      The patient will return for new or worsening symptoms and is stable at the time of discharge.    The patient is referred to a primary physician for blood pressure management, diabetic screening, and for all other preventative health concerns.      DISPOSITION:  Patient will be discharged home in stable condition.    FOLLOW UP:  [address] [address]  [phone]  Schedule an appointment as soon as possible for a visit in 3 days        OUTPATIENT MEDICATIONS:  New Prescriptions    METRONIDAZOLE (FLAGYL) 500 MG TAB    Take 1 Tablet by mouth 2 times a day for 7 days.         FINAL  IMPRESSION  1. Abdominal pain, unspecified abdominal location    2. Non-intractable vomiting with nausea, unspecified vomiting type    3. Gardnerella vaginitis          I, René Garcia (Scribe), am scribing for, and in the presence of, Ok Mcadams M.D..    Electronically signed by: René Garcia (Scribe), 4/29/2022    Ok GARCIA M.D. personally performed the services described in this documentation, as scribed by René Garcia in my presence, and it is both accurate and complete.    The note accurately reflects work and decisions made by me.  Ok Mcadams M.D.  4/29/2022  9:54 PM

## 2022-04-29 NOTE — ED TRIAGE NOTES
"Chief Complaint   Patient presents with   • Abdominal Pain     Across upper abd x 1 week.    • N/V     X2 weeks.      Pt to triage for above. Protocol ordered.   Reports negative pregnancy at home but feels like her last period wasn't normal.     /79   Pulse 72   Temp 36.6 °C (97.9 °F) (Temporal)   Resp 16   Ht 1.651 m (5' 5\")   Wt 84.4 kg (186 lb 1.1 oz)   SpO2 100%   BMI 30.96 kg/m²     "

## 2022-04-29 NOTE — ED NOTES
Pt ambulated to room from [facility]. Changed into gown. Connected to monitor. Agree with triage note. Chart up for ERP. Call light in reach.

## 2022-04-30 LAB
C TRACH DNA GENITAL QL NAA+PROBE: NEGATIVE
N GONORRHOEA DNA GENITAL QL NAA+PROBE: NEGATIVE
SPECIMEN SOURCE: NORMAL

## 2022-04-30 NOTE — DISCHARGE INSTRUCTIONS
Drink plenty of fluids.  Return to the emergency department for worsening abdominal pain, if you have fever, vomiting, or any other complaints.  Follow-up with your primary care doctor or [facility].  Take antibiotics as prescribed.  Do not drink alcohol while taking Flagyl.  This will make you very sick